# Patient Record
Sex: FEMALE | Race: WHITE | NOT HISPANIC OR LATINO
[De-identification: names, ages, dates, MRNs, and addresses within clinical notes are randomized per-mention and may not be internally consistent; named-entity substitution may affect disease eponyms.]

---

## 2018-09-28 ENCOUNTER — RESULT REVIEW (OUTPATIENT)
Age: 48
End: 2018-09-28

## 2018-11-03 ENCOUNTER — RESULT REVIEW (OUTPATIENT)
Age: 48
End: 2018-11-03

## 2018-11-07 ENCOUNTER — RESULT REVIEW (OUTPATIENT)
Age: 48
End: 2018-11-07

## 2018-12-27 PROBLEM — Z00.00 ENCOUNTER FOR PREVENTIVE HEALTH EXAMINATION: Status: ACTIVE | Noted: 2018-12-27

## 2019-01-23 ENCOUNTER — APPOINTMENT (OUTPATIENT)
Dept: SURGERY | Facility: CLINIC | Age: 49
End: 2019-01-23
Payer: COMMERCIAL

## 2019-01-23 VITALS
RESPIRATION RATE: 15 BRPM | HEIGHT: 68 IN | OXYGEN SATURATION: 98 % | HEART RATE: 84 BPM | WEIGHT: 183 LBS | BODY MASS INDEX: 27.74 KG/M2 | DIASTOLIC BLOOD PRESSURE: 78 MMHG | SYSTOLIC BLOOD PRESSURE: 120 MMHG

## 2019-01-23 DIAGNOSIS — Z80.3 FAMILY HISTORY OF MALIGNANT NEOPLASM OF BREAST: ICD-10-CM

## 2019-01-23 DIAGNOSIS — K59.00 CONSTIPATION, UNSPECIFIED: ICD-10-CM

## 2019-01-23 DIAGNOSIS — Z78.9 OTHER SPECIFIED HEALTH STATUS: ICD-10-CM

## 2019-01-23 DIAGNOSIS — K29.60 OTHER GASTRITIS W/OUT BLEEDING: ICD-10-CM

## 2019-01-23 PROCEDURE — 99244 OFF/OP CNSLTJ NEW/EST MOD 40: CPT

## 2019-01-23 NOTE — CONSULT LETTER
[Dear  ___] : Dear ~ADY, [Sincerely,] : Sincerely, [FreeTextEntry2] : Dr. Manuel Ramires [FreeTextEntry1] : At your recommendation I saw Laura Smith in the office on January 23rd for evaluation of gallbladder disease. She stated that about 6 months ago she began experiencing bouts of severe right quadrant abdominal pain radiating into the right back. Each of the episodes awoke her from sleep in the middle of the night and were associated with nausea and vomiting but no fever or chills. At worst, the pain reached 8/10 in intensity and lasted for up to several hours each time. She denied any history of jaundice, dark urine or acholic stools but as you know, her abdominal ultrasound confirmed the presence of multiple gallstones. The ducts were noted to be of normal caliber and no signs of acute inflammation were seen. According to her report, her endoscopy showed evidence of gastritis and she was treated with antibiotics for H. pylori. Her family history is significant for her father, mother and grandmother all having a history of gallstones.\par \par On exam the patient was anicteric and in no distress. The abdomen was soft, nondistended and entirely nontender without palpable mass or organomegaly. The remainder of the exam was noncontributory.\par \par I discussed the situation with Ms. Smith and advised elective laparoscopic cholecystectomy for her symptomatic gallstones. Once she comes to surgery I will update you on her status and thanks very much for allowing me to participate in this pleasant woman's care. [FreeTextEntry3] : \par \par Josue Waggoner M.D., F.A.C.S. [DrDo  ___] : Dr. FU

## 2019-01-23 NOTE — PHYSICAL EXAM
[Normal Thyroid] : the thyroid was normal [Respiratory Effort] : normal respiratory effort [Normal Rate and Rhythm] : normal rate and rhythm [Abdominal Aorta] : Normal abdominal aorta [No Rash or Lesion] : No rash or lesion [Oriented to Person] : oriented to person [Oriented to Place] : oriented to place [Oriented to Time] : oriented to time [Calm] : calm [de-identified] : Anicteric, in no distress [de-identified] : Normocephalic, atraumatic [de-identified] : No palpable adenopathy [de-identified] : Soft, nondistended, nontender. No palpable mass or organomegaly. [de-identified] : Normal gait; no deformities [de-identified] : No gross sensory or motor deficit [de-identified] : Normal mood and affect

## 2019-01-23 NOTE — PLAN
[FreeTextEntry1] : Advised elective laparoscopic cholecystectomy. Discussed with patient nature of, indications for and risks/benefits of surgery.

## 2019-01-23 NOTE — HISTORY OF PRESENT ILLNESS
[de-identified] : Laura is a 47 y/o female here for evaluation of symptomatic cholelithiasis. Abdominal ultrasound from 12/15/18 demonstrated small gallstones measuring up to 8 mm in diametes. Gallbladder is normal in size and configuration. There is no gallbladder wall thickening or pericholecystic fluid collection.  [de-identified] : Approximately 6 months ago patient began to experience bouts of severe right upper quadrant abdominal pain radiating into the right back. In total she had 3 episodes all of which awoke her from sleep in the middle of the night. Pain reached 8/10 in intensity and lasted for several hours each time. Each episode was associated with nausea and vomiting but no fever or chills and no history of jaundice, dark urine or acholic stools. The patient was eventually seen by GI and underwent upper endoscopy which showed some evidence of gastritis. She was also treated with antibiotics for H. pylori and subsequent abdominal ultrasound confirmed the presence of multiple gallstones with normal ductal caliber and no signs of acute inflammation. Her last episode occurred about 3 months ago.

## 2019-01-28 ENCOUNTER — OUTPATIENT (OUTPATIENT)
Dept: OUTPATIENT SERVICES | Facility: HOSPITAL | Age: 49
LOS: 1 days | End: 2019-01-28
Payer: COMMERCIAL

## 2019-01-28 ENCOUNTER — TRANSCRIPTION ENCOUNTER (OUTPATIENT)
Age: 49
End: 2019-01-28

## 2019-01-28 VITALS
HEIGHT: 67 IN | RESPIRATION RATE: 20 BRPM | OXYGEN SATURATION: 98 % | TEMPERATURE: 98 F | DIASTOLIC BLOOD PRESSURE: 78 MMHG | SYSTOLIC BLOOD PRESSURE: 118 MMHG | WEIGHT: 186.95 LBS | HEART RATE: 100 BPM

## 2019-01-28 DIAGNOSIS — R87.619 UNSPECIFIED ABNORMAL CYTOLOGICAL FINDINGS IN SPECIMENS FROM CERVIX UTERI: Chronic | ICD-10-CM

## 2019-01-28 DIAGNOSIS — K80.20 CALCULUS OF GALLBLADDER WITHOUT CHOLECYSTITIS WITHOUT OBSTRUCTION: ICD-10-CM

## 2019-01-28 DIAGNOSIS — Z98.890 OTHER SPECIFIED POSTPROCEDURAL STATES: Chronic | ICD-10-CM

## 2019-01-28 LAB
ALBUMIN SERPL ELPH-MCNC: 4.5 G/DL — SIGNIFICANT CHANGE UP (ref 3.3–5)
ALP SERPL-CCNC: 61 U/L — SIGNIFICANT CHANGE UP (ref 40–120)
ALT FLD-CCNC: 15 U/L — SIGNIFICANT CHANGE UP (ref 10–45)
ANION GAP SERPL CALC-SCNC: 12 MMOL/L — SIGNIFICANT CHANGE UP (ref 5–17)
AST SERPL-CCNC: 17 U/L — SIGNIFICANT CHANGE UP (ref 10–40)
BILIRUB SERPL-MCNC: 0.2 MG/DL — SIGNIFICANT CHANGE UP (ref 0.2–1.2)
BUN SERPL-MCNC: 14 MG/DL — SIGNIFICANT CHANGE UP (ref 7–23)
CALCIUM SERPL-MCNC: 9.3 MG/DL — SIGNIFICANT CHANGE UP (ref 8.4–10.5)
CHLORIDE SERPL-SCNC: 102 MMOL/L — SIGNIFICANT CHANGE UP (ref 96–108)
CO2 SERPL-SCNC: 27 MMOL/L — SIGNIFICANT CHANGE UP (ref 22–31)
CREAT SERPL-MCNC: 0.69 MG/DL — SIGNIFICANT CHANGE UP (ref 0.5–1.3)
GLUCOSE SERPL-MCNC: 105 MG/DL — HIGH (ref 70–99)
HCT VFR BLD CALC: 39 % — SIGNIFICANT CHANGE UP (ref 34.5–45)
HGB BLD-MCNC: 12.7 G/DL — SIGNIFICANT CHANGE UP (ref 11.5–15.5)
MCHC RBC-ENTMCNC: 29.3 PG — SIGNIFICANT CHANGE UP (ref 27–34)
MCHC RBC-ENTMCNC: 32.6 GM/DL — SIGNIFICANT CHANGE UP (ref 32–36)
MCV RBC AUTO: 89.9 FL — SIGNIFICANT CHANGE UP (ref 80–100)
PLATELET # BLD AUTO: 253 K/UL — SIGNIFICANT CHANGE UP (ref 150–400)
POTASSIUM SERPL-MCNC: 3.3 MMOL/L — LOW (ref 3.5–5.3)
POTASSIUM SERPL-SCNC: 3.3 MMOL/L — LOW (ref 3.5–5.3)
PROT SERPL-MCNC: 7.8 G/DL — SIGNIFICANT CHANGE UP (ref 6–8.3)
RBC # BLD: 4.34 M/UL — SIGNIFICANT CHANGE UP (ref 3.8–5.2)
RBC # FLD: 12.6 % — SIGNIFICANT CHANGE UP (ref 10.3–14.5)
SODIUM SERPL-SCNC: 141 MMOL/L — SIGNIFICANT CHANGE UP (ref 135–145)
WBC # BLD: 8.63 K/UL — SIGNIFICANT CHANGE UP (ref 3.8–10.5)
WBC # FLD AUTO: 8.63 K/UL — SIGNIFICANT CHANGE UP (ref 3.8–10.5)

## 2019-01-28 PROCEDURE — 80053 COMPREHEN METABOLIC PANEL: CPT

## 2019-01-28 PROCEDURE — G0463: CPT

## 2019-01-28 PROCEDURE — 85027 COMPLETE CBC AUTOMATED: CPT

## 2019-01-28 RX ORDER — CEFAZOLIN SODIUM 1 G
2000 VIAL (EA) INJECTION ONCE
Qty: 0 | Refills: 0 | Status: DISCONTINUED | OUTPATIENT
Start: 2019-01-29 | End: 2019-02-13

## 2019-01-28 NOTE — H&P PST ADULT - NSANTHOSAYNRD_GEN_A_CORE
No. ROBY screening performed.  STOP BANG Legend: 0-2 = LOW Risk; 3-4 = INTERMEDIATE Risk; 5-8 = HIGH Risk

## 2019-01-29 ENCOUNTER — APPOINTMENT (OUTPATIENT)
Dept: SURGERY | Facility: HOSPITAL | Age: 49
End: 2019-01-29
Payer: COMMERCIAL

## 2019-01-29 ENCOUNTER — RESULT REVIEW (OUTPATIENT)
Age: 49
End: 2019-01-29

## 2019-01-29 ENCOUNTER — OUTPATIENT (OUTPATIENT)
Dept: OUTPATIENT SERVICES | Facility: HOSPITAL | Age: 49
LOS: 1 days | End: 2019-01-29
Payer: COMMERCIAL

## 2019-01-29 VITALS
HEART RATE: 93 BPM | DIASTOLIC BLOOD PRESSURE: 81 MMHG | OXYGEN SATURATION: 99 % | RESPIRATION RATE: 16 BRPM | SYSTOLIC BLOOD PRESSURE: 116 MMHG | TEMPERATURE: 99 F

## 2019-01-29 VITALS
RESPIRATION RATE: 16 BRPM | DIASTOLIC BLOOD PRESSURE: 70 MMHG | TEMPERATURE: 99 F | WEIGHT: 186.95 LBS | HEIGHT: 67 IN | HEART RATE: 88 BPM | SYSTOLIC BLOOD PRESSURE: 105 MMHG | OXYGEN SATURATION: 99 %

## 2019-01-29 DIAGNOSIS — R87.619 UNSPECIFIED ABNORMAL CYTOLOGICAL FINDINGS IN SPECIMENS FROM CERVIX UTERI: Chronic | ICD-10-CM

## 2019-01-29 DIAGNOSIS — K80.20 CALCULUS OF GALLBLADDER WITHOUT CHOLECYSTITIS WITHOUT OBSTRUCTION: ICD-10-CM

## 2019-01-29 DIAGNOSIS — Z98.890 OTHER SPECIFIED POSTPROCEDURAL STATES: Chronic | ICD-10-CM

## 2019-01-29 LAB — HCG UR QL: NEGATIVE — SIGNIFICANT CHANGE UP

## 2019-01-29 PROCEDURE — 81025 URINE PREGNANCY TEST: CPT

## 2019-01-29 PROCEDURE — 88304 TISSUE EXAM BY PATHOLOGIST: CPT | Mod: 26

## 2019-01-29 PROCEDURE — 88304 TISSUE EXAM BY PATHOLOGIST: CPT

## 2019-01-29 PROCEDURE — 47562 LAPAROSCOPIC CHOLECYSTECTOMY: CPT

## 2019-01-29 RX ORDER — SODIUM CHLORIDE 9 MG/ML
3 INJECTION INTRAMUSCULAR; INTRAVENOUS; SUBCUTANEOUS EVERY 8 HOURS
Qty: 0 | Refills: 0 | Status: DISCONTINUED | OUTPATIENT
Start: 2019-01-29 | End: 2019-01-29

## 2019-01-29 RX ORDER — HYDROMORPHONE HYDROCHLORIDE 2 MG/ML
0.5 INJECTION INTRAMUSCULAR; INTRAVENOUS; SUBCUTANEOUS
Qty: 0 | Refills: 0 | Status: DISCONTINUED | OUTPATIENT
Start: 2019-01-29 | End: 2019-01-29

## 2019-01-29 RX ORDER — SODIUM CHLORIDE 9 MG/ML
1000 INJECTION, SOLUTION INTRAVENOUS
Qty: 0 | Refills: 0 | Status: DISCONTINUED | OUTPATIENT
Start: 2019-01-29 | End: 2019-02-13

## 2019-01-29 RX ORDER — LIDOCAINE HCL 20 MG/ML
0.2 VIAL (ML) INJECTION ONCE
Qty: 0 | Refills: 0 | Status: DISCONTINUED | OUTPATIENT
Start: 2019-01-29 | End: 2019-01-29

## 2019-01-29 RX ORDER — ONDANSETRON 8 MG/1
4 TABLET, FILM COATED ORAL ONCE
Qty: 0 | Refills: 0 | Status: DISCONTINUED | OUTPATIENT
Start: 2019-01-29 | End: 2019-01-29

## 2019-01-29 RX ADMIN — HYDROMORPHONE HYDROCHLORIDE 0.5 MILLIGRAM(S): 2 INJECTION INTRAMUSCULAR; INTRAVENOUS; SUBCUTANEOUS at 14:15

## 2019-01-29 NOTE — ASU DISCHARGE PLAN (ADULT/PEDIATRIC). - MEDICATION SUMMARY - MEDICATIONS TO TAKE
I will START or STAY ON the medications listed below when I get home from the hospital:    Percocet 5/325 oral tablet  -- 1 tab(s) by mouth every 6 hours MDD:4  -- Caution federal law prohibits the transfer of this drug to any person other  than the person for whom it was prescribed.  May cause drowsiness.  Alcohol may intensify this effect.  Use care when operating dangerous machinery.  This prescription cannot be refilled.  This product contains acetaminophen.  Do not use  with any other product containing acetaminophen to prevent possible liver damage.  Using more of this medication than prescribed may cause serious breathing problems.    -- Indication: For Post operative pain control     Vitamin D3 1000 intl units oral capsule  -- 1 cap(s) by mouth once a day  -- Indication: For Nutritional supplement    Vitamin B-12 500 mcg oral tablet  -- 1 tab(s) by mouth once a day  -- Indication: For Nutritional supplement

## 2019-01-29 NOTE — ASU DISCHARGE PLAN (ADULT/PEDIATRIC). - NOTIFY
Persistent Nausea and Vomiting/Fever greater than 101/Pain not relieved by Medications/Inability to Tolerate Liquids or Foods

## 2019-01-31 PROBLEM — K81.9 CHOLECYSTITIS, UNSPECIFIED: Chronic | Status: ACTIVE | Noted: 2019-01-28

## 2019-01-31 PROBLEM — A04.8 OTHER SPECIFIED BACTERIAL INTESTINAL INFECTIONS: Chronic | Status: ACTIVE | Noted: 2019-01-28

## 2019-02-15 ENCOUNTER — APPOINTMENT (OUTPATIENT)
Dept: SURGERY | Facility: CLINIC | Age: 49
End: 2019-02-15
Payer: COMMERCIAL

## 2019-02-15 VITALS
TEMPERATURE: 98 F | HEART RATE: 86 BPM | RESPIRATION RATE: 16 BRPM | SYSTOLIC BLOOD PRESSURE: 112 MMHG | OXYGEN SATURATION: 100 % | DIASTOLIC BLOOD PRESSURE: 77 MMHG

## 2019-02-15 DIAGNOSIS — Z09 ENCOUNTER FOR FOLLOW-UP EXAMINATION AFTER COMPLETED TREATMENT FOR CONDITIONS OTHER THAN MALIGNANT NEOPLASM: ICD-10-CM

## 2019-02-15 DIAGNOSIS — K80.20 CALCULUS OF GALLBLADDER W/OUT CHOLECYSTITIS W/OUT OBSTRUCTION: ICD-10-CM

## 2019-02-15 PROCEDURE — 99024 POSTOP FOLLOW-UP VISIT: CPT

## 2019-02-15 RX ORDER — OXYCODONE AND ACETAMINOPHEN 5; 325 MG/1; MG/1
5-325 TABLET ORAL
Qty: 10 | Refills: 0 | Status: DISCONTINUED | COMMUNITY
Start: 2019-01-31 | End: 2019-02-15

## 2019-02-15 NOTE — HISTORY OF PRESENT ILLNESS
[de-identified] : Laura is a 47 y/o female here for post-operative visit. 1/29/19- laparoscopic cholecystectomy. \par \par  [de-identified] : Status post laparoscopic cholecystectomy on 1/29/19. At present has no complaint of pain. Tolerating diet well and having regular bowel activity with the help of MiraLax.

## 2019-02-15 NOTE — CONSULT LETTER
[Dear  ___] : Dear ~ADY, [Sincerely,] : Sincerely, [FreeTextEntry2] : Dr. Manuel Ramires [FreeTextEntry1] : I saw Laura Smith back in the office for a postop check on February 15th. Since undergoing her laparoscopic cholecystectomy at the end of last month, Ms. Smith has done quite well. At today's visit she stated that she was pain-free, having used no narcotics in the postop period.\par \par On exam, the abdomen was soft, nondistended and nontender and all the trocar sites were noted to be healing well. I advised Ms. Smith that she may continue to gradually liberalize her activities as she feels able and I have discharged her from any further postoperative followup. Thanks once again for allowing me to participate in this pleasant woman's care. [FreeTextEntry3] : \par \par Josue Waggoner M.D., F.A.C.S.

## 2020-03-09 ENCOUNTER — RESULT REVIEW (OUTPATIENT)
Age: 50
End: 2020-03-09

## 2020-11-07 NOTE — H&P PST ADULT - DOCUMENT STATUS
BP low over night, 94/53 this AM. BP cuff changed to appropriate size (small) and BPs now (130s-140s)/(70s-80s). PRN oxy and zofran given x2 for upset stomach and stomach pain - relief following. CT of abd done for concern of abd infection per MD verbally, waiting for results. Pt likely to discharge tomorrow. Up w/SBA. AMbulated hallways with RN. Pt reaching for railings and something to hold onto. Walker ordered. Good urine output and output through stoma. BG managed w/sliding scale, 210 & 221. Liver biopsy dressing changed.   Authored by Resident/PA/NP

## 2021-07-20 ENCOUNTER — TRANSCRIPTION ENCOUNTER (OUTPATIENT)
Age: 51
End: 2021-07-20

## 2021-09-08 ENCOUNTER — TRANSCRIPTION ENCOUNTER (OUTPATIENT)
Age: 51
End: 2021-09-08

## 2021-09-23 ENCOUNTER — RESULT REVIEW (OUTPATIENT)
Age: 51
End: 2021-09-23

## 2021-11-24 ENCOUNTER — APPOINTMENT (OUTPATIENT)
Dept: MRI IMAGING | Facility: IMAGING CENTER | Age: 51
End: 2021-11-24
Payer: COMMERCIAL

## 2021-11-24 ENCOUNTER — OUTPATIENT (OUTPATIENT)
Dept: OUTPATIENT SERVICES | Facility: HOSPITAL | Age: 51
LOS: 1 days | End: 2021-11-24
Payer: COMMERCIAL

## 2021-11-24 DIAGNOSIS — R87.619 UNSPECIFIED ABNORMAL CYTOLOGICAL FINDINGS IN SPECIMENS FROM CERVIX UTERI: Chronic | ICD-10-CM

## 2021-11-24 DIAGNOSIS — Z98.890 OTHER SPECIFIED POSTPROCEDURAL STATES: Chronic | ICD-10-CM

## 2021-11-24 DIAGNOSIS — Z00.8 ENCOUNTER FOR OTHER GENERAL EXAMINATION: ICD-10-CM

## 2021-11-24 PROCEDURE — 73718 MRI LOWER EXTREMITY W/O DYE: CPT

## 2021-11-24 PROCEDURE — 73718 MRI LOWER EXTREMITY W/O DYE: CPT | Mod: 26,RT

## 2022-04-11 ENCOUNTER — OUTPATIENT (OUTPATIENT)
Dept: OUTPATIENT SERVICES | Facility: HOSPITAL | Age: 52
LOS: 1 days | End: 2022-04-11
Payer: COMMERCIAL

## 2022-04-11 VITALS
TEMPERATURE: 98 F | WEIGHT: 182.1 LBS | SYSTOLIC BLOOD PRESSURE: 144 MMHG | HEART RATE: 91 BPM | RESPIRATION RATE: 15 BRPM | OXYGEN SATURATION: 97 % | HEIGHT: 68 IN | DIASTOLIC BLOOD PRESSURE: 87 MMHG

## 2022-04-11 DIAGNOSIS — R87.619 UNSPECIFIED ABNORMAL CYTOLOGICAL FINDINGS IN SPECIMENS FROM CERVIX UTERI: Chronic | ICD-10-CM

## 2022-04-11 DIAGNOSIS — Z01.818 ENCOUNTER FOR OTHER PREPROCEDURAL EXAMINATION: ICD-10-CM

## 2022-04-11 DIAGNOSIS — G57.61 LESION OF PLANTAR NERVE, RIGHT LOWER LIMB: ICD-10-CM

## 2022-04-11 DIAGNOSIS — Z11.52 ENCOUNTER FOR SCREENING FOR COVID-19: ICD-10-CM

## 2022-04-11 DIAGNOSIS — Z98.890 OTHER SPECIFIED POSTPROCEDURAL STATES: Chronic | ICD-10-CM

## 2022-04-11 DIAGNOSIS — Z90.49 ACQUIRED ABSENCE OF OTHER SPECIFIED PARTS OF DIGESTIVE TRACT: Chronic | ICD-10-CM

## 2022-04-11 LAB — SARS-COV-2 RNA SPEC QL NAA+PROBE: SIGNIFICANT CHANGE UP

## 2022-04-11 PROCEDURE — 36415 COLL VENOUS BLD VENIPUNCTURE: CPT

## 2022-04-11 PROCEDURE — G0463: CPT

## 2022-04-11 PROCEDURE — U0005: CPT

## 2022-04-11 PROCEDURE — U0003: CPT

## 2022-04-11 PROCEDURE — 85027 COMPLETE CBC AUTOMATED: CPT

## 2022-04-11 PROCEDURE — C9803: CPT

## 2022-04-11 RX ORDER — SODIUM CHLORIDE 9 MG/ML
1000 INJECTION, SOLUTION INTRAVENOUS
Refills: 0 | Status: DISCONTINUED | OUTPATIENT
Start: 2022-04-13 | End: 2022-04-27

## 2022-04-11 RX ORDER — CHOLECALCIFEROL (VITAMIN D3) 125 MCG
1 CAPSULE ORAL
Qty: 0 | Refills: 0 | DISCHARGE

## 2022-04-11 RX ORDER — PREGABALIN 225 MG/1
1 CAPSULE ORAL
Qty: 0 | Refills: 0 | DISCHARGE

## 2022-04-11 RX ORDER — LIDOCAINE HCL 20 MG/ML
0.2 VIAL (ML) INJECTION ONCE
Refills: 0 | Status: DISCONTINUED | OUTPATIENT
Start: 2022-04-13 | End: 2022-04-27

## 2022-04-11 NOTE — H&P PST ADULT - NSICDXPASTSURGICALHX_GEN_ALL_CORE_FT
PAST SURGICAL HISTORY:  Abnormal Pap smear of cervix colposcopy    S/P laparoscopic cholecystectomy 3- 4 years    S/P rhinoplasty age 21

## 2022-04-11 NOTE — H&P PST ADULT - FALL HARM RISK - UNIVERSAL INTERVENTIONS
Bed in lowest position, wheels locked, appropriate side rails in place/Call bell, personal items and telephone in reach/Instruct patient to call for assistance before getting out of bed or chair/Non-slip footwear when patient is out of bed/[place] to call system/Physically safe environment - no spills, clutter or unnecessary equipment/Purposeful Proactive Rounding/Room/bathroom lighting operational, light cord in reach

## 2022-04-11 NOTE — H&P PST ADULT - HISTORY OF PRESENT ILLNESS
51 yr old female with hx of neuroma right  foot for surgery.    **COVID  denies foreign travel  denies s/s  denies known exposure  denies recent  infection  swab 4/11 Kenna   vaccine x 2 Pfizer

## 2022-04-11 NOTE — H&P PST ADULT - NSICDXPASTMEDICALHX_GEN_ALL_CORE_FT
PAST MEDICAL HISTORY:  2019 novel coronavirus disease (COVID-19) March 2020  home quar[place]    Cholecystitis     H. pylori infection     Lesion of plantar nerve, right lower limb

## 2022-04-12 ENCOUNTER — TRANSCRIPTION ENCOUNTER (OUTPATIENT)
Age: 52
End: 2022-04-12

## 2022-04-13 ENCOUNTER — RESULT REVIEW (OUTPATIENT)
Age: 52
End: 2022-04-13

## 2022-04-13 ENCOUNTER — OUTPATIENT (OUTPATIENT)
Dept: OUTPATIENT SERVICES | Facility: HOSPITAL | Age: 52
LOS: 1 days | End: 2022-04-13
Payer: COMMERCIAL

## 2022-04-13 VITALS
TEMPERATURE: 98 F | OXYGEN SATURATION: 100 % | SYSTOLIC BLOOD PRESSURE: 111 MMHG | HEIGHT: 68 IN | DIASTOLIC BLOOD PRESSURE: 66 MMHG | RESPIRATION RATE: 17 BRPM | HEART RATE: 89 BPM | WEIGHT: 182.1 LBS

## 2022-04-13 VITALS
OXYGEN SATURATION: 100 % | SYSTOLIC BLOOD PRESSURE: 117 MMHG | DIASTOLIC BLOOD PRESSURE: 64 MMHG | HEART RATE: 80 BPM | RESPIRATION RATE: 16 BRPM | TEMPERATURE: 98 F

## 2022-04-13 DIAGNOSIS — Z98.890 OTHER SPECIFIED POSTPROCEDURAL STATES: Chronic | ICD-10-CM

## 2022-04-13 DIAGNOSIS — R87.619 UNSPECIFIED ABNORMAL CYTOLOGICAL FINDINGS IN SPECIMENS FROM CERVIX UTERI: Chronic | ICD-10-CM

## 2022-04-13 DIAGNOSIS — G57.61 LESION OF PLANTAR NERVE, RIGHT LOWER LIMB: ICD-10-CM

## 2022-04-13 DIAGNOSIS — Z90.49 ACQUIRED ABSENCE OF OTHER SPECIFIED PARTS OF DIGESTIVE TRACT: Chronic | ICD-10-CM

## 2022-04-13 PROCEDURE — 28080 REMOVAL OF FOOT LESION: CPT | Mod: RT

## 2022-04-13 PROCEDURE — 88304 TISSUE EXAM BY PATHOLOGIST: CPT

## 2022-04-13 PROCEDURE — 88304 TISSUE EXAM BY PATHOLOGIST: CPT | Mod: 26

## 2022-04-13 RX ORDER — CHOLECALCIFEROL (VITAMIN D3) 125 MCG
1 CAPSULE ORAL
Qty: 0 | Refills: 0 | DISCHARGE

## 2022-04-13 RX ORDER — DOCUSATE SODIUM 100 MG
1 CAPSULE ORAL
Qty: 0 | Refills: 0 | DISCHARGE

## 2022-04-13 RX ORDER — ONDANSETRON 8 MG/1
4 TABLET, FILM COATED ORAL ONCE
Refills: 0 | Status: DISCONTINUED | OUTPATIENT
Start: 2022-04-13 | End: 2022-04-27

## 2022-04-13 RX ORDER — OXYCODONE HYDROCHLORIDE 5 MG/1
5 TABLET ORAL ONCE
Refills: 0 | Status: DISCONTINUED | OUTPATIENT
Start: 2022-04-13 | End: 2022-04-13

## 2022-04-13 RX ORDER — ASCORBIC ACID 60 MG
1 TABLET,CHEWABLE ORAL
Qty: 0 | Refills: 0 | DISCHARGE

## 2022-04-13 RX ORDER — FENTANYL CITRATE 50 UG/ML
25 INJECTION INTRAVENOUS
Refills: 0 | Status: DISCONTINUED | OUTPATIENT
Start: 2022-04-13 | End: 2022-04-13

## 2022-04-13 RX ORDER — L.ACIDOPH/B.ANIMALIS/B.LONGUM 15B CELL
1 CAPSULE ORAL
Qty: 0 | Refills: 0 | DISCHARGE

## 2022-04-13 RX ADMIN — SODIUM CHLORIDE 100 MILLILITER(S): 9 INJECTION, SOLUTION INTRAVENOUS at 10:42

## 2022-04-13 NOTE — ASU PATIENT PROFILE, ADULT - NS PRO ABUSE SCREEN SUSPICION NEGLECT YN
Tracy called and stated that her OBGYN told her to give Dr. Degroot's office a call to schedule a breast reduction consult. She is currently wearing a G-bra and has been thinking about this for some time now. I instructed Tracy to call her insurance company to make sure they do not require a referral to see a specialist. She stated she will and will call us back before her appointment if they do. Consult appointment scheduled for 10/20/21.   
no

## 2022-04-13 NOTE — ASU DISCHARGE PLAN (ADULT/PEDIATRIC) - NS MD DC FALL RISK RISK
For information on Fall & Injury Prevention, visit: [email or web address] OR  [email or web address] OR  https://www.cdc.gov/steadi/patient.html

## 2022-04-13 NOTE — ASU DISCHARGE PLAN (ADULT/PEDIATRIC) - ASU DC SPECIAL INSTRUCTIONSFT
-weight bearing as tolerated to right heel in a surgical shoe  -keep dressing clean, dry and intact till follow up  -follow up with Dr. Fregoso within 1 week

## 2022-04-13 NOTE — ASU PATIENT PROFILE, ADULT - FALL HARM RISK - UNIVERSAL INTERVENTIONS
Bed in lowest position, wheels locked, appropriate side rails in place/Call bell, personal items and telephone in reach/Instruct patient to call for assistance before getting out of bed or chair/Non-slip footwear when patient is out of bed/[place] to call system/Physically safe environment - no spills, clutter or unnecessary equipment/Purposeful Proactive Rounding/Room/bathroom lighting operational, light cord in reach

## 2022-04-13 NOTE — ASU PATIENT PROFILE, ADULT - NSICDXPASTSURGICALHX_GEN_ALL_CORE_FT
PAST SURGICAL HISTORY:  Abnormal Pap smear of cervix colposcopy    S/P laparoscopic cholecystectomy 3- 4 years    S/P rhinoplasty age 21     29-Aug-2020 15:01

## 2022-04-13 NOTE — ASU DISCHARGE PLAN (ADULT/PEDIATRIC) - NURSING INSTRUCTIONS
Next dose of Tylenol will be on or after ______6pm_____ ,today/tonight and every 6 hours afterwards for pain management, do not take any Tylenol containing products until this time. Your first dose of Tylenol was given at __11:55am_________. Do not exceed more than 4000mg of Tylenol in one 24 hour setting.

## 2022-04-19 LAB — SURGICAL PATHOLOGY STUDY: SIGNIFICANT CHANGE UP

## 2022-12-30 PROBLEM — U07.1 COVID-19: Chronic | Status: ACTIVE | Noted: 2022-04-11

## 2022-12-30 PROBLEM — G57.61 LESION OF PLANTAR NERVE, RIGHT LOWER LIMB: Chronic | Status: ACTIVE | Noted: 2022-04-11

## 2023-02-10 ENCOUNTER — APPOINTMENT (OUTPATIENT)
Dept: OBGYN | Facility: CLINIC | Age: 53
End: 2023-02-10

## 2023-03-17 ENCOUNTER — APPOINTMENT (OUTPATIENT)
Dept: OBGYN | Facility: CLINIC | Age: 53
End: 2023-03-17

## 2023-11-28 NOTE — ASU PATIENT PROFILE, ADULT - NSALCOHOLAMT_GEN_A_CORE_SD
History  Chief Complaint   Patient presents with    Abnormal ECG     Pt was sent here by provider d/t abnormal ecg . Pt reports feeling jittery and felt her heart was racing   earlier. Pt denies that now. Pt denies chest pain or racing at this time. This is a 69-year-old female who is presenting with concerns for abnormal heart rate in her doctor's office today with abnormal ECG. Patient has a medical history significant for hypertension, diabetes, and obstructive sleep apnea. She reports that today when she went to go see her doctor she was was feeling rather lightheaded, chest pain, and palpitations. She had an EKG performed in the office which was suspicious for atrial flutter. Cardiology reviewed the EKG and told patient she should come to the emergency department to be evaluated. Patient says that shortly thereafter her symptoms resolved completely. At the time of my examination, patient appears to be in a normal sinus rhythm on the telemetry monitor. Patient states that she feels like she is otherwise in her normal state of health, she is denying any recent significant GI losses, any heat intolerance, palpitations, or tremulousness/anxiety. She denies any prior history of thyroid disease. She denies any recent illness symptoms of nausea, vomiting, fever, cough, congestion. Patient is not anticoagulated. Patient says that her symptoms had completely resolved by 130 this afternoon. Prior to Admission Medications   Prescriptions Last Dose Informant Patient Reported? Taking? Blood Glucose Monitoring Suppl (OneTouch Verio Reflect) w/Device KIT   No No   Sig: Check blood sugars once daily. Please substitute with appropriate alternative as covered by patient's insurance.  Dx: E11.65   Cholecalciferol (Vitamin D3) 125 MCG (5000 UT) TABS  Self No No   Sig: Take 1 tablet (5,000 Units total) by mouth daily   Lutein 20 MG CAPS  Self Yes No   Sig: Take 1 capsule by mouth daily   OneTouch Delica Lancets 33G MISC   No No   Sig: Check blood sugars once daily. Please substitute with appropriate alternative as covered by patient's insurance. Dx: E11.65   Propylene Glycol 0.6 % SOLN  Self Yes No   Sig: Apply 1 drop to eye   ciclopirox (PENLAC) 8 % solution  Self Yes No   Sig: Apply as directed   estradiol (ESTRACE) 0.1 mg/g vaginal cream  Self No No   Sig: APPLY 2 TO 3 TIMES PER WEEK   fenofibrate (TRICOR) 145 mg tablet  Self No No   Sig: TAKE 1 TABLET DAILY   fluticasone (FLONASE) 50 mcg/act nasal spray   No No   Si spray into each nostril daily   glucose blood (OneTouch Verio) test strip   No No   Sig: Check blood sugars once daily. Please substitute with appropriate alternative as covered by patient's insurance.  Dx: E11.65   meclizine (ANTIVERT) 25 mg tablet  Self No No   Sig: Take 1 tablet (25 mg total) by mouth 3 (three) times a day as needed for dizziness   naproxen (NAPROSYN) 500 mg tablet  Self No No   Sig: TAKE 1 TABLET TWICE A DAY AS NEEDED FOR MILD PAIN   omeprazole (PriLOSEC) 20 mg delayed release capsule  Self No No   Sig: TAKE 1 CAPSULE DAILY   valsartan (DIOVAN) 160 mg tablet  Self No No   Sig: TAKE 1 TABLET DAILY   vitamin B-12 (VITAMIN B-12) 1,000 mcg tablet  Self No No   Sig: TAKE 1 TABLET DAILY   zolpidem (AMBIEN) 10 mg tablet  Self No No   Sig: TAKE 1 TABLET DAILY AT BEDTIME      Facility-Administered Medications: None       Past Medical History:   Diagnosis Date    Bacterial vaginitis 10/8/2018    Complex atypical endometrial hyperplasia 2018    CPAP (continuous positive airway pressure) dependence     does not use machine    DJD (degenerative joint disease)     Dyspareunia 2015    Gait abnormality     GERD (gastroesophageal reflux disease)     History of transfusion     Hyperlipidemia     Obesity (BMI 35.0-39.9 without comorbidity)     Sleep apnea     Uterine polyp 2017       Past Surgical History:   Procedure Laterality Date    BLADDER SUSPENSION      CHOLECYSTECTOMY COLONOSCOPY      CYSTOSCOPY N/A 01/23/2018    Procedure: CYSTOSCOPY;  Surgeon: Melany Gipson MD;  Location: [facility];  Service: Gynecology Oncology    EYE SURGERY      HYSTERECTOMY      JOINT REPLACEMENT Bilateral     MS HYSTEROSCOPY BX ENDOMETRIUM&/POLYPC W/WO D&C N/A 12/01/2017    Procedure: DILATATION AND CURETTAGE (D&C) WITH HYSTEROSCOPY; POLYPECTOMY;  Surgeon: Judd Crow MD;  Location: [facility];  Service: Gynecology    MS LAPS TOTAL HYSTERECT 250 GM/< W/RMVL TUBE/OVARY N/A 01/23/2018    Procedure: ROBOTIC ASSISTED TOTAL LAPAROSCOPIC HYSTERECTOMY; BILATERAL SALPINGOOPHERECTOMY;  Surgeon: Melany Gipson MD;  Location: [facility];  Service: Gynecology Oncology    REDUCTION MAMMAPLASTY Bilateral 18yrs ago    REPLACEMENT TOTAL KNEE BILATERAL      ROTATOR CUFF REPAIR Left     TUBAL LIGATION         Family History   Problem Relation Age of Onset    No Known Problems Mother     Emphysema Father     Bone cancer Sister     Lung cancer Sister     Brain cancer Sister     No Known Problems Sister     No Known Problems Sister     No Known Problems Sister     No Known Problems Daughter     Breast cancer Maternal Grandmother     No Known Problems Maternal Aunt     No Known Problems Maternal Aunt     No Known Problems Paternal Aunt     No Known Problems Paternal Aunt     No Known Problems Paternal Aunt     No Known Problems Paternal Aunt     Ovarian cancer Neg Hx     Colon cancer Neg Hx      I have reviewed and agree with the history as documented. E-Cigarette/Vaping    E-Cigarette Use Never User      E-Cigarette/Vaping Substances    Nicotine No     THC No     CBD No     Flavoring No     Other No     Unknown No      Social History     Tobacco Use    Smoking status: Never    Smokeless tobacco: Never   Vaping Use    Vaping Use: Never used   Substance Use Topics    Alcohol use: No    Drug use: No        Review of Systems   Constitutional:  Negative for chills and fever.    HENT:  Negative for ear pain and sore throat. Eyes:  Negative for pain and visual disturbance. Respiratory:  Negative for cough and shortness of breath. Cardiovascular:  Positive for chest pain (resolved) and palpitations (resolved). Gastrointestinal:  Negative for abdominal pain and vomiting. Genitourinary:  Negative for dysuria and hematuria. Musculoskeletal:  Negative for arthralgias and back pain. Skin:  Negative for color change and rash. Neurological:  Negative for seizures and syncope. All other systems reviewed and are negative. Physical Exam  ED Triage Vitals   Temperature Pulse Respirations Blood Pressure SpO2   11/28/23 1834 11/28/23 1745 11/28/23 1745 11/28/23 1745 11/28/23 1745   98.3 °F (36.8 °C) 77 18 130/62 92 %      Temp Source Heart Rate Source Patient Position - Orthostatic VS BP Location FiO2 (%)   11/28/23 1834 11/28/23 1745 11/28/23 1745 11/28/23 1745 --   Oral Monitor Lying Left arm       Pain Score       11/28/23 1745       No Pain             Orthostatic Vital Signs  Vitals:    11/28/23 1745 11/28/23 2029   BP: 130/62 140/63   Pulse: 77 64   Patient Position - Orthostatic VS: Lying        Physical Exam  Vitals and nursing note reviewed. Constitutional:       General: She is not in acute distress. Appearance: She is well-developed. She is obese. HENT:      Head: Normocephalic and atraumatic. Right Ear: External ear normal.      Left Ear: External ear normal.      Nose: Nose normal. No congestion or rhinorrhea. Mouth/Throat:      Mouth: Mucous membranes are moist.      Pharynx: Oropharynx is clear. No oropharyngeal exudate or posterior oropharyngeal erythema. Eyes:      General: No scleral icterus. Extraocular Movements: Extraocular movements intact. Conjunctiva/sclera: Conjunctivae normal.      Pupils: Pupils are equal, round, and reactive to light. Cardiovascular:      Rate and Rhythm: Normal rate and regular rhythm. Pulses: Normal pulses.       Heart sounds: Normal heart sounds. No murmur heard. Pulmonary:      Effort: Pulmonary effort is normal. No respiratory distress. Breath sounds: Normal breath sounds. No wheezing or rhonchi. Abdominal:      General: Abdomen is flat. There is no distension. Palpations: Abdomen is soft. Tenderness: There is no abdominal tenderness. There is no guarding. Musculoskeletal:         General: No swelling. Cervical back: Neck supple. No rigidity. Right lower leg: No edema. Left lower leg: No edema. Lymphadenopathy:      Cervical: No cervical adenopathy. Skin:     General: Skin is warm and dry. Capillary Refill: Capillary refill takes less than 2 seconds. Coloration: Skin is not jaundiced. Findings: No rash. Neurological:      General: No focal deficit present. Mental Status: She is alert and oriented to person, place, and time. Mental status is at baseline.    Psychiatric:         Mood and Affect: Mood normal.         Behavior: Behavior normal.         ED Medications  Medications   sodium chloride (PF) 0.9 % injection 3 mL (has no administration in time range)       Diagnostic Studies  Results Reviewed       Procedure Component Value Units Date/Time    HS Troponin I 2hr [[id number]]  (Normal) Collected: 11/28/23 2008    Lab Status: Final result Specimen: Blood from Arm, Left Updated: 11/28/23 2033     hs TnI 2hr 6 ng/L      Delta 2hr hsTnI -1 ng/L     HS Troponin I 4hr [[id number]]     Lab Status: No result Specimen: Blood     Comprehensive metabolic panel [[id number]]  (Abnormal) Collected: 11/28/23 1816    Lab Status: Final result Specimen: Blood from Arm, Left Updated: 11/28/23 1857     Sodium 139 mmol/L      Potassium 4.0 mmol/L      Chloride 105 mmol/L      CO2 26 mmol/L      ANION GAP 8 mmol/L      BUN 20 mg/dL      Creatinine 1.18 mg/dL      Glucose 122 mg/dL      Calcium 9.3 mg/dL      AST 40 U/L      ALT 24 U/L      Alkaline Phosphatase 60 U/L      Total Protein 7.8 g/dL Albumin 4.3 g/dL      Total Bilirubin 0.32 mg/dL      eGFR 47 ml/min/1.73sq m     Narrative:      [facility] guidelines for Chronic Kidney Disease (CKD):     Stage 1 with normal or high GFR (GFR > 90 mL/min/1.73 square meters)    Stage 2 Mild CKD (GFR = 60-89 mL/min/1.73 square meters)    Stage 3A Moderate CKD (GFR = 45-59 mL/min/1.73 square meters)    Stage 3B Moderate CKD (GFR = 30-44 mL/min/1.73 square meters)    Stage 4 Severe CKD (GFR = 15-29 mL/min/1.73 square meters)    Stage 5 End Stage CKD (GFR <15 mL/min/1.73 square meters)  Note: GFR calculation is accurate only with a steady state creatinine    TSH, 3rd generation with Free T4 reflex [[id number]]  (Normal) Collected: 11/28/23 1816    Lab Status: Final result Specimen: Blood from Arm, Left Updated: 11/28/23 1857     TSH 3RD GENERATON 2.596 uIU/mL     HS Troponin 0hr (reflex protocol) [[id number]]  (Normal) Collected: 11/28/23 1816    Lab Status: Final result Specimen: Blood from Arm, Left Updated: 11/28/23 1849     hs TnI 0hr 7 ng/L     CBC and differential [[id number]]  (Abnormal) Collected: 11/28/23 1816    Lab Status: Final result Specimen: Blood from Arm, Left Updated: 11/28/23 1825     WBC 10.17 Thousand/uL      RBC 4.59 Million/uL      Hemoglobin 13.2 g/dL      Hematocrit 41.9 %      MCV 91 fL      MCH 28.8 pg      MCHC 31.5 g/dL      RDW 13.3 %      MPV 9.8 fL      Platelets 763 Thousands/uL      nRBC 0 /100 WBCs      Neutrophils Relative 61 %      Immat GRANS % 1 %      Lymphocytes Relative 26 %      Monocytes Relative 8 %      Eosinophils Relative 3 %      Basophils Relative 1 %      Neutrophils Absolute 6.22 Thousands/µL      Immature Grans Absolute 0.06 Thousand/uL      Lymphocytes Absolute 2.66 Thousands/µL      Monocytes Absolute 0.84 Thousand/µL      Eosinophils Absolute 0.31 Thousand/µL      Basophils Absolute 0.08 Thousands/µL                    X-ray chest 1 view portable    (Results Pending) Procedures  Procedures      ED Course  ED Course as of 11/28/23 2141 Tue Nov 28, 2023 1818 ECG interpreted by me. Date: 11/28/2023. Time: 1818. Rate: 79 bpm.  Axis: Normal.  Rhythm: Regular. There are no ST elevations or depressions evident on this ECG. There are no T wave flattening or inversions noted. This is a normal ECG with normal sinus rhythm at this time. [id number] SCORE      Flowsheet Row Most Recent Value   [id number]    Age 1 Filed at: 11/28/2023 1741   Sex 1 Filed at: 11/28/2023 1741   CHF History 0 Filed at: 11/28/2023 1741   HTN History 1 Filed at: 11/28/2023 1741   Stroke or TIA Symptoms Previously 0 Filed at: 11/28/2023 1741   Vascular Disease History 0 Filed at: 11/28/2023 1741   Diabetes History 1 Filed at: 11/28/2023 1741   [id number] Score 4 Filed at: 11/28/2023 1741              HEART Risk Score      Flowsheet Row Most Recent Value   Heart Score Risk Calculator    History 0 Filed at: 11/28/2023 1820   ECG 0 Filed at: 11/28/2023 1820   Age 2 Filed at: 11/28/2023 1820   Risk Factors 2 Filed at: 11/28/2023 1820   Troponin 0 Filed at: 11/28/2023 1820   HEART Score 4 Filed at: 11/28/2023 1820                        SBIRT 20yo+      Flowsheet Row Most Recent Value   Initial Alcohol Screen: US AUDIT-C     1. How often do you have a drink containing alcohol? 0 Filed at: 11/28/2023 1747   2. How many drinks containing alcohol do you have on a typical day you are drinking? 0 Filed at: 11/28/2023 1747   3b. FEMALE Any Age, or MALE 65+: How often do you have 4 or more drinks on one occassion? 0 Filed at: 11/28/2023 1747   Audit-C Score 0 Filed at: 11/28/2023 1747   TESS: How many times in the past year have you. .. Used an illegal drug or used a prescription medication for non-medical reasons?  Never Filed at: 11/28/2023 7225                  Medical Decision Making  Medical complexity: Patient was sent in by her primary care doctor tonight for concern for atrial flutter that was apparently evident in the office today. I have no EKG from the office, however at this time patient is in normal sinus rhythm. Will continue to monitor on telemetry. She was having chest pain earlier at her visit. Will screen with cardiac biomarkers and serial EKGs if symptoms return. Will screen thyroid panel as well. Will continue to monitor for signs of arrhythmia on telemetry while patient is here. Patient does have a [id number] score of 4, if is having an atrial arrhythmia, will need discussion about anticoagulation. Reassessment/disposition: Patient remained asymptomatic throughout her emergency department stay, no arrhythmia present on workup and telemetry. Patient had no elevation in troponin. Therefore no indication for admission at this time. Will prescribe Holter monitor, will refer to cardiology, and will have patient follow-up with her PCP regarding her concerning EKG today. Patient was told to immediately come back to the emergency department if she has a return of her palpitations, lightheadedness, or chest pain or strokelike symptoms. She expressed understanding and agrees to come back if she develops new symptoms. Otherwise we will follow-up as above. Amount and/or Complexity of Data Reviewed  Labs: ordered. Radiology: ordered. Risk  Prescription drug management. Disposition  Final diagnoses:   Abnormal EKG   Palpitations     Time reflects when diagnosis was documented in both MDM as applicable and the Disposition within this note       Time User Action Codes Description Comment    11/28/2023  6:20 PM Socorro Robison [R94.31] Abnormal EKG     11/28/2023  6:20 PM Socorro Robison [R00.2] Palpitations           ED Disposition       ED Disposition   Discharge    Condition   Stable    Date/Time   Tue Nov 28, 2023 2035    [address] [facility] discharge to home/self care.                    Follow-up Information       Follow up With Specialties Details Why Contact Info Additional Information    Carlos Kent DO Family Medicine  Call to schedule follow-up appointment to discuss anticoagulation [id number] [address] Emergency Department Emergency Medicine Go to  If symptoms worsen, As needed [address] [place] [id number]  Dionte Nguyen Emergency Department, [address] [facility], [address]    Guilherme Rosa MD Cardiology  Call cardiology to schedule appointment [address] [facility]. [address] [facility]  [phone]               Discharge Medication List as of 11/28/2023  8:36 PM        CONTINUE these medications which have NOT CHANGED    Details   Blood Glucose Monitoring Suppl (OneTouch Verio Reflect) w/Device KIT Check blood sugars once daily. Please substitute with appropriate alternative as covered by patient's insurance. Dx: E11.65, Normal      Cholecalciferol (Vitamin D3) 125 MCG (5000 UT) TABS Take 1 tablet (5,000 Units total) by mouth daily, Starting Thu 8/25/2022, Normal      ciclopirox (PENLAC) 8 % solution Apply as directed, Starting Wed 8/16/2023, Historical Med      estradiol (ESTRACE) 0.1 mg/g vaginal cream APPLY 2 TO 3 TIMES PER WEEK, Normal      fenofibrate (TRICOR) 145 mg tablet TAKE 1 TABLET DAILY, Starting Fri 8/4/2023, Normal      fluticasone (FLONASE) 50 mcg/act nasal spray 1 spray into each nostril daily, Starting Tue 11/28/2023, Normal      glucose blood (OneTouch Verio) test strip Check blood sugars once daily. Please substitute with appropriate alternative as covered by patient's insurance.  Dx: E11.65, Normal      Lutein 20 MG CAPS Take 1 capsule by mouth daily, Historical Med      meclizine (ANTIVERT) 25 mg tablet Take 1 tablet (25 mg total) by mouth 3 (three) times a day as needed for dizziness, Starting Fri 1/13/2023, Normal      naproxen (NAPROSYN) 500 mg tablet TAKE 1 TABLET TWICE A DAY AS NEEDED FOR MILD PAIN, Normal      omeprazole (PriLOSEC) 20 mg delayed release capsule TAKE 1 CAPSULE DAILY, Starting Fri 8/4/2023, Normal      OneTouch Delica Lancets 66M MISC Check blood sugars once daily. Please substitute with appropriate alternative as covered by patient's insurance. Dx: E11.65, Normal      Propylene Glycol 0.6 % SOLN Apply 1 drop to eye, Historical Med      valsartan (DIOVAN) 160 mg tablet TAKE 1 TABLET DAILY, Starting Mon 8/14/2023, Normal      vitamin B-12 (VITAMIN B-12) 1,000 mcg tablet TAKE 1 TABLET DAILY, Normal      zolpidem (AMBIEN) 10 mg tablet TAKE 1 TABLET DAILY AT BEDTIME, Starting Thu 11/2/2023, Normal           Outpatient Discharge Orders   Ambulatory Referral to Cardiology   Standing Status: Future Standing Exp. Date: 11/28/24      Holter monitor   Standing Status: Future Standing Exp. Date: 11/28/27       PDMP Review         Value Time User    PDMP Reviewed  Yes 11/2/2023  8:37 AM Charisse Perez DO             ED Provider  Attending physically available and evaluated Krystal Darby. I managed the patient along with the ED Attending.     Electronically Signed by           Vanna Ruiz MD  11/28/23 4747 1-2 drinks

## (undated) DEVICE — SPECIMEN CONTAINER 100ML

## (undated) DEVICE — DRSG COBAN 4"

## (undated) DEVICE — SOL IRR POUR H2O 250ML

## (undated) DEVICE — DRAPE INSTRUMENT POUCH 6.75" X 11"

## (undated) DEVICE — DRSG XEROFORM 1 X 8"

## (undated) DEVICE — PREP BETADINE KIT

## (undated) DEVICE — SOL IRR POUR NS 0.9% 500ML

## (undated) DEVICE — TOURNIQUET ESMARK 4"

## (undated) DEVICE — DRSG ACE BANDAGE 4" NS

## (undated) DEVICE — DRAPE SPLIT SHEET 77" X 108"

## (undated) DEVICE — PACK HAND

## (undated) DEVICE — DRSG STERISTRIPS 0.5 X 4"

## (undated) DEVICE — TOURNIQUET CUFF 18" DUAL PORT SINGLE BLADDER W PLC  (BLACK)

## (undated) DEVICE — Device

## (undated) DEVICE — DRSG KLING 3"

## (undated) DEVICE — DRAPE 1/2 SHEET 40X57"

## (undated) DEVICE — DRSG WEBRIL 4"

## (undated) DEVICE — POSITIONER FOAM EGG CRATE ULNAR 2PCS (PINK)

## (undated) DEVICE — PACK MINOR

## (undated) DEVICE — DRAPE EXTREMITY 87" X 128.5"